# Patient Record
Sex: MALE | Race: WHITE
[De-identification: names, ages, dates, MRNs, and addresses within clinical notes are randomized per-mention and may not be internally consistent; named-entity substitution may affect disease eponyms.]

---

## 2017-08-11 ENCOUNTER — HOSPITAL ENCOUNTER (EMERGENCY)
Dept: HOSPITAL 19 - COL.ER | Age: 22
Discharge: HOME | End: 2017-08-11
Payer: COMMERCIAL

## 2017-08-11 VITALS — SYSTOLIC BLOOD PRESSURE: 120 MMHG | HEART RATE: 70 BPM | DIASTOLIC BLOOD PRESSURE: 76 MMHG

## 2017-08-11 VITALS — TEMPERATURE: 99.2 F

## 2017-08-11 VITALS — WEIGHT: 160.28 LBS | HEIGHT: 72.99 IN | BODY MASS INDEX: 21.24 KG/M2

## 2017-08-11 DIAGNOSIS — G43.909: Primary | ICD-10-CM

## 2019-06-13 ENCOUNTER — HOSPITAL ENCOUNTER (OUTPATIENT)
Dept: HOSPITAL 19 - SDCO | Age: 24
Discharge: HOME | End: 2019-06-13
Attending: SURGERY
Payer: COMMERCIAL

## 2019-06-13 VITALS — SYSTOLIC BLOOD PRESSURE: 123 MMHG | DIASTOLIC BLOOD PRESSURE: 71 MMHG | HEART RATE: 84 BPM

## 2019-06-13 VITALS — DIASTOLIC BLOOD PRESSURE: 72 MMHG | SYSTOLIC BLOOD PRESSURE: 119 MMHG | HEART RATE: 82 BPM

## 2019-06-13 VITALS — WEIGHT: 159.84 LBS | HEIGHT: 73 IN | BODY MASS INDEX: 21.18 KG/M2

## 2019-06-13 VITALS — SYSTOLIC BLOOD PRESSURE: 121 MMHG | HEART RATE: 81 BPM | DIASTOLIC BLOOD PRESSURE: 77 MMHG

## 2019-06-13 VITALS — DIASTOLIC BLOOD PRESSURE: 70 MMHG | HEART RATE: 94 BPM | SYSTOLIC BLOOD PRESSURE: 117 MMHG | TEMPERATURE: 98.4 F

## 2019-06-13 VITALS — TEMPERATURE: 98.5 F | HEART RATE: 58 BPM | DIASTOLIC BLOOD PRESSURE: 67 MMHG | SYSTOLIC BLOOD PRESSURE: 128 MMHG

## 2019-06-13 VITALS — SYSTOLIC BLOOD PRESSURE: 121 MMHG | HEART RATE: 93 BPM | DIASTOLIC BLOOD PRESSURE: 74 MMHG

## 2019-06-13 DIAGNOSIS — K40.90: Primary | ICD-10-CM

## 2019-06-13 PROCEDURE — C1781 MESH (IMPLANTABLE): HCPCS

## 2019-06-13 NOTE — NUR
Pt to [facility] via cart from PACU. Pt drowsy, but awake. Pt states "I have some
discomfort where my hernia is, but not pain." Incisions x3 to abdomen are
clean, and dry and secured with poon set. Pt takes sips of water without
difficulties. Family in room. Will continue to monitor. Call light within
reach.

## 2019-06-13 NOTE — NUR
Pt up to restroom with stand by assistance. Gait steady. Pt voids large amount
without difficulties. Pt back to room. IV site discontinued with all parts
intact. Pt became diaphoretic, pale and nauseated once IV was taken out. Pt
assisted to laying back down. Placed in trendelenburg, and cool washcloth
placed on forehead. Nurse to remain at bedside.

## 2019-06-13 NOTE — NUR
Norco 5mg 1 tablet po given per prn orders for pain. Pt tolerating Jello and
crackers without difficulties.

## 2019-06-13 NOTE — NUR
Pt continues to rest. Tolerating food and fluids without difficulties. Will
continue to monitor. Call light within reach.

## 2019-06-13 NOTE — NUR
Pt continues to rest. Family at side. Pt rates pain 4/10 to abdomen. Pt c/o
"slight" nausea. Will continue to monitor.

## 2019-06-13 NOTE — NUR
Pt feeling better. No longer pale, diaphoretic or nauseated. Pt tolerating
HOB up 60 degrees. Will continue to monitor. Discharge instructions reviewed
with pt and family. Call light within reach.